# Patient Record
Sex: MALE | Race: WHITE
[De-identification: names, ages, dates, MRNs, and addresses within clinical notes are randomized per-mention and may not be internally consistent; named-entity substitution may affect disease eponyms.]

---

## 2018-01-23 ENCOUNTER — HOSPITAL ENCOUNTER (OUTPATIENT)
Dept: HOSPITAL 68 - STS | Age: 68
End: 2018-01-23
Attending: UROLOGY
Payer: COMMERCIAL

## 2018-01-23 VITALS — HEIGHT: 68 IN | WEIGHT: 187 LBS | BODY MASS INDEX: 28.34 KG/M2

## 2018-01-23 DIAGNOSIS — N21.0: ICD-10-CM

## 2018-01-23 DIAGNOSIS — N13.2: Primary | ICD-10-CM

## 2018-01-23 DIAGNOSIS — I10: ICD-10-CM

## 2018-01-23 LAB
ABSOLUTE GRANULOCYTE CT: 3.7 /CUMM (ref 1.4–6.5)
BASOPHILS # BLD: 0 /CUMM (ref 0–0.2)
BASOPHILS NFR BLD: 0.4 % (ref 0–2)
EOSINOPHIL # BLD: 0.1 /CUMM (ref 0–0.7)
EOSINOPHIL NFR BLD: 1.3 % (ref 0–5)
ERYTHROCYTE [DISTWIDTH] IN BLOOD BY AUTOMATED COUNT: 12.4 % (ref 11.5–14.5)
GRANULOCYTES NFR BLD: 60.4 % (ref 42.2–75.2)
HCT VFR BLD CALC: 45.1 % (ref 42–52)
LYMPHOCYTES # BLD: 1.8 /CUMM (ref 1.2–3.4)
MCH RBC QN AUTO: 29.9 PG (ref 27–31)
MCHC RBC AUTO-ENTMCNC: 33.9 G/DL (ref 33–37)
MCV RBC AUTO: 88.2 FL (ref 80–94)
MONOCYTES # BLD: 0.5 /CUMM (ref 0.1–0.6)
PLATELET # BLD: 160 /CUMM (ref 130–400)
PMV BLD AUTO: 9.8 FL (ref 7.4–10.4)
RED BLOOD CELL CT: 5.11 /CUMM (ref 4.7–6.1)
WBC # BLD AUTO: 6.1 /CUMM (ref 4.8–10.8)

## 2018-01-31 NOTE — OPERATIVE REPORT
Operative/Inv Procedure Report
Surgery Date: 01/23/18
Name of Procedure:
left ureter eswl/flouro.
Pre-Operative Diagnosis:
left ureter stone with colic and hydro.
Post-Operative Diagnosis:
same
Estimated Blood Loss: none
Surgeon/Assistant:
Ramirez Lopez MD
 
Anesthesia: moderate sedation
Complications:
none
 
Operative/Procedure Note
Note:
The patient was taken to the operating room and placed on the ESWL table in 
supine position.  Time out was performed, with the patient awake, to confirm 
identity, procedure, laterality, and other pertinent binta-operative information.
 
 
After adequate anesthesia, the patient was positioned so that the left flank was
placed over the ESWL table cut-out, and overlying the dome of the shockwave 
generator.  C-arm fluroscopy, as well as renal US was used to locate the 
proximal ureter stone, and evaluate the left kidney.  The stone was visible on 
fluroloscopy at the proximal-left ureter.  Renal US confirmed mild 
hydronephrosis, with no additional stone seen in the left kidney.  The left 
ureter stone was approximate 8 mm in size, and visible with fluoroscopy.  Using 
fluoroscopy, the position of the ureter stone was optimized for ESWL, using AP, 
and oblique views of the stone.  Subsequently, E.S.W.L. was initiated at low 
power levels x 200 shocks.  After noting the patient's tolerance to the 
shockwaves,  the shock wave power level was quickly maximized.   At the end of 
the procedure, the composition of the stone had changed significantly indicating
the pulverization of the ureter stone.  A total of 3000 shockwaves were 
delivered to the stone in order to achieve adequate lithotrypsy.  
 
The patient tolerated the procedures well, was awakened, and  taken to recovery 
in satisfactory condition via stretcher.  The pt will eventually be dischared to
home with pain meds, diet orders, and intructions to catch fragments with 
straining the urine.  The patient is to have follow-up renal ultrasound and KUB 
(after the left renal stone is treated as well). 
Discharge Disposition: Same Day Admissions
CC:
Ramirez Lopez MD